# Patient Record
Sex: FEMALE | Race: OTHER | HISPANIC OR LATINO
[De-identification: names, ages, dates, MRNs, and addresses within clinical notes are randomized per-mention and may not be internally consistent; named-entity substitution may affect disease eponyms.]

---

## 2018-05-31 ENCOUNTER — TRANSCRIPTION ENCOUNTER (OUTPATIENT)
Age: 23
End: 2018-05-31

## 2021-03-01 PROBLEM — Z00.00 ENCOUNTER FOR PREVENTIVE HEALTH EXAMINATION: Status: ACTIVE | Noted: 2021-03-01

## 2021-03-04 ENCOUNTER — APPOINTMENT (OUTPATIENT)
Dept: OBGYN | Facility: CLINIC | Age: 26
End: 2021-03-04
Payer: COMMERCIAL

## 2021-03-04 ENCOUNTER — OUTPATIENT (OUTPATIENT)
Dept: OUTPATIENT SERVICES | Facility: HOSPITAL | Age: 26
LOS: 1 days | Discharge: HOME | End: 2021-03-04

## 2021-03-04 VITALS — DIASTOLIC BLOOD PRESSURE: 60 MMHG | WEIGHT: 158 LBS | SYSTOLIC BLOOD PRESSURE: 110 MMHG

## 2021-03-04 DIAGNOSIS — Z34.90 ENCOUNTER FOR SUPERVISION OF NORMAL PREGNANCY, UNSPECIFIED, UNSPECIFIED TRIMESTER: ICD-10-CM

## 2021-03-04 PROCEDURE — 99203 OFFICE O/P NEW LOW 30 MIN: CPT

## 2021-03-04 NOTE — DISCUSSION/SUMMARY
[FreeTextEntry1] : 24yo G1 at 6+3 weeks, considering TOP\par -counseled about options medical vs surgical; has medical option until 10 weeks but the earlier the more successful.  Discussed expecteed risks/symptoms of each, benefits of each.\par -discussed contraception options including pill, previously used OCP vs LARCs and benefits of each\par -pt has family involved who are supportive of her decision either way\par -info given to call back with any more questions or with a decision, all questions answered

## 2021-03-04 NOTE — HISTORY OF PRESENT ILLNESS
[FreeTextEntry1] : 24yo  LMP 21 here for possible pregnancy termination, undecided at this time, but wants to know options.  Otherwise no complaints.

## 2021-03-04 NOTE — PROCEDURE
[Transabdominal OB Sonogram] : Transabdominal OB Sonogram [Fetal Heart] : fetal heart present [Current GA by Sonogram: ___ (wks)] : Current GA by Sonogram: [unfilled]Uwks

## 2021-03-18 ENCOUNTER — NON-APPOINTMENT (OUTPATIENT)
Age: 26
End: 2021-03-18

## 2021-03-18 ENCOUNTER — APPOINTMENT (OUTPATIENT)
Dept: OBGYN | Facility: CLINIC | Age: 26
End: 2021-03-18
Payer: SELF-PAY

## 2021-03-18 ENCOUNTER — OUTPATIENT (OUTPATIENT)
Dept: OUTPATIENT SERVICES | Facility: HOSPITAL | Age: 26
LOS: 1 days | Discharge: HOME | End: 2021-03-18

## 2021-03-18 ENCOUNTER — APPOINTMENT (OUTPATIENT)
Dept: ANTEPARTUM | Facility: CLINIC | Age: 26
End: 2021-03-18
Payer: MEDICAID

## 2021-03-18 ENCOUNTER — RESULT CHARGE (OUTPATIENT)
Age: 26
End: 2021-03-18

## 2021-03-18 ENCOUNTER — ASOB RESULT (OUTPATIENT)
Age: 26
End: 2021-03-18

## 2021-03-18 VITALS — SYSTOLIC BLOOD PRESSURE: 110 MMHG | DIASTOLIC BLOOD PRESSURE: 70 MMHG | WEIGHT: 158 LBS

## 2021-03-18 DIAGNOSIS — Z34.90 ENCOUNTER FOR SUPERVISION OF NORMAL PREGNANCY, UNSPECIFIED, UNSPECIFIED TRIMESTER: ICD-10-CM

## 2021-03-18 PROCEDURE — 76817 TRANSVAGINAL US OBSTETRIC: CPT | Mod: 26

## 2021-03-18 PROCEDURE — 99214 OFFICE O/P EST MOD 30 MIN: CPT | Mod: 25

## 2021-03-21 NOTE — DISCUSSION/SUMMARY
[FreeTextEntry1] : 24yo at 8 wks with missed \par -discussed ultrasound findings w/ patient and confirmation on official sono\par -options for management discussed including expectant, medical, and surgical w/ risks of each\par -pt prefers expectant management at this time to see if miscarriage will occur spontaneously\par -precautions for miscarriage given including pain/bleeding to be expected, seek emergency care if more bleeding than 2 pads/hr for 2 hrs\par -all questions answered, may f/u in 1-2 wks, or call sooner if wanting to have active management

## 2021-03-21 NOTE — PROCEDURE
[Transvaginal OB Sonogram] : Transvaginal OB Sonogram [Intrauterine Pregnancy] : intrauterine pregnancy [Fetal Heart] : no fetal heart [Current GA by Sonogram: ___ (wks)] : Current GA by Sonogram: [unfilled]Uwks [FreeTextEntry1] : findings c/w missed

## 2021-03-21 NOTE — HISTORY OF PRESENT ILLNESS
[FreeTextEntry1] : 26yo G1 at 8 wks by previous u/s here to initate prenatal care without complaints.  No bleeding or pain.  On ultrasound in office, missed ab measuring 7 wks was diagnosed and confirmed on official sonogram as well.

## 2021-03-22 LAB
ABO + RH PNL BLD: NORMAL
BASOPHILS # BLD AUTO: 0.06 K/UL
BASOPHILS NFR BLD AUTO: 0.7 %
BILIRUB UR QL STRIP: NORMAL
BLD GP AB SCN SERPL QL: NORMAL
C TRACH RRNA SPEC QL NAA+PROBE: NOT DETECTED
CLARITY UR: CLEAR
COLLECTION METHOD: NORMAL
EOSINOPHIL # BLD AUTO: 0.17 K/UL
EOSINOPHIL NFR BLD AUTO: 2 %
GLUCOSE UR-MCNC: NORMAL
HCG SERPL-MCNC: ABNORMAL MIU/ML
HCG UR QL: 0.2 EU/DL
HCT VFR BLD CALC: 36.1 %
HGB BLD-MCNC: 11.5 G/DL
HGB UR QL STRIP.AUTO: NORMAL
IMM GRANULOCYTES NFR BLD AUTO: 0.2 %
KETONES UR-MCNC: NORMAL
LEUKOCYTE ESTERASE UR QL STRIP: NORMAL
LYMPHOCYTES # BLD AUTO: 1.87 K/UL
LYMPHOCYTES NFR BLD AUTO: 22.1 %
MAN DIFF?: NORMAL
MCHC RBC-ENTMCNC: 29.7 PG
MCHC RBC-ENTMCNC: 31.9 G/DL
MCV RBC AUTO: 93.3 FL
MONOCYTES # BLD AUTO: 0.48 K/UL
MONOCYTES NFR BLD AUTO: 5.7 %
N GONORRHOEA RRNA SPEC QL NAA+PROBE: NOT DETECTED
NEUTROPHILS # BLD AUTO: 5.88 K/UL
NEUTROPHILS NFR BLD AUTO: 69.3 %
NITRITE UR QL STRIP: NORMAL
PH UR STRIP: 6.5
PLATELET # BLD AUTO: 301 K/UL
PROT UR STRIP-MCNC: NORMAL
RBC # BLD: 3.87 M/UL
RBC # FLD: 14.1 %
SOURCE AMPLIFICATION: NORMAL
SP GR UR STRIP: 1
WBC # FLD AUTO: 8.48 K/UL

## 2021-03-25 ENCOUNTER — APPOINTMENT (OUTPATIENT)
Dept: OBGYN | Facility: CLINIC | Age: 26
End: 2021-03-25

## 2021-03-29 DIAGNOSIS — O02.1 MISSED ABORTION: ICD-10-CM

## 2021-03-29 DIAGNOSIS — Z34.90 ENCOUNTER FOR SUPERVISION OF NORMAL PREGNANCY, UNSPECIFIED, UNSPECIFIED TRIMESTER: ICD-10-CM

## 2021-04-01 ENCOUNTER — TRANSCRIPTION ENCOUNTER (OUTPATIENT)
Age: 26
End: 2021-04-01

## 2021-04-01 ENCOUNTER — APPOINTMENT (OUTPATIENT)
Dept: OBGYN | Facility: CLINIC | Age: 26
End: 2021-04-01
Payer: COMMERCIAL

## 2021-04-01 ENCOUNTER — OUTPATIENT (OUTPATIENT)
Dept: OUTPATIENT SERVICES | Facility: HOSPITAL | Age: 26
LOS: 1 days | Discharge: HOME | End: 2021-04-01

## 2021-04-01 VITALS
WEIGHT: 159 LBS | BODY MASS INDEX: 30.02 KG/M2 | SYSTOLIC BLOOD PRESSURE: 110 MMHG | HEIGHT: 61 IN | DIASTOLIC BLOOD PRESSURE: 70 MMHG

## 2021-04-01 DIAGNOSIS — O02.1 MISSED ABORTION: ICD-10-CM

## 2021-04-01 LAB — CYTOLOGY CVX/VAG DOC THIN PREP: ABNORMAL

## 2021-04-01 PROCEDURE — 99214 OFFICE O/P EST MOD 30 MIN: CPT

## 2021-04-01 RX ORDER — MISOPROSTOL 200 UG/1
200 TABLET ORAL
Qty: 4 | Refills: 0 | Status: ACTIVE | COMMUNITY
Start: 2021-04-01 | End: 1900-01-01

## 2021-04-01 RX ADMIN — MIFEPRISTONE 0 MG: 200 TABLET ORAL at 00:00

## 2021-04-03 NOTE — HISTORY OF PRESENT ILLNESS
[FreeTextEntry1] : 24yo w/ missed Ab at 8 weeks here for follow up.  Last seen 2 weeks ago, desiring expectant management, has not had any bleeding/cramping pain, now desiring active management.

## 2021-04-03 NOTE — DISCUSSION/SUMMARY
[FreeTextEntry1] : 26yo w/ missed ab at 8 weeks desiring medical management\par -labs reviewed; GC neg, RH+, HGB 11+\par -discussed medical and surgical options for management w/ risks benefits of each\par -pt desiring medical option; aware of expected heavy bleeding and cramping and GI side effects.  Discussed precautions for bleeding more than 2pads/hr x2 hrs should be evaluated in ED; should have someone home while doing  in case of emergency\par -instructed on how to take medications; mife in office, then 4 tabs miso at home buccal and when to expect symptoms\par -rx sent to pharmacy\par -all questions answered, 2 week f/u appiontment schecduled

## 2021-04-15 ENCOUNTER — NON-APPOINTMENT (OUTPATIENT)
Age: 26
End: 2021-04-15

## 2021-04-15 ENCOUNTER — APPOINTMENT (OUTPATIENT)
Dept: OBGYN | Facility: CLINIC | Age: 26
End: 2021-04-15

## 2021-12-08 ENCOUNTER — TRANSCRIPTION ENCOUNTER (OUTPATIENT)
Age: 26
End: 2021-12-08